# Patient Record
Sex: FEMALE | Race: WHITE | NOT HISPANIC OR LATINO | URBAN - NONMETROPOLITAN AREA
[De-identification: names, ages, dates, MRNs, and addresses within clinical notes are randomized per-mention and may not be internally consistent; named-entity substitution may affect disease eponyms.]

---

## 2019-01-18 ENCOUNTER — OFFICE (OUTPATIENT)
Dept: URBAN - NONMETROPOLITAN AREA CLINIC 13 | Facility: CLINIC | Age: 59
End: 2019-01-18
Payer: MEDICAID

## 2019-01-18 VITALS
OXYGEN SATURATION: 90 % | DIASTOLIC BLOOD PRESSURE: 67 MMHG | HEIGHT: 65 IN | SYSTOLIC BLOOD PRESSURE: 169 MMHG | WEIGHT: 216 LBS | SYSTOLIC BLOOD PRESSURE: 173 MMHG | DIASTOLIC BLOOD PRESSURE: 68 MMHG | HEART RATE: 73 BPM

## 2019-01-18 VITALS — HEIGHT: 65 IN

## 2019-01-18 DIAGNOSIS — Z79.899 OTHER LONG TERM (CURRENT) DRUG THERAPY: ICD-10-CM

## 2019-01-18 DIAGNOSIS — K21.9 GASTRO-ESOPHAGEAL REFLUX DISEASE WITHOUT ESOPHAGITIS: ICD-10-CM

## 2019-01-18 DIAGNOSIS — Z01.812 ENCOUNTER FOR PREPROCEDURAL LABORATORY EXAMINATION: ICD-10-CM

## 2019-01-18 DIAGNOSIS — Z83.71 FAMILY HISTORY OF COLONIC POLYPS: ICD-10-CM

## 2019-01-18 LAB
CBC SYSMEX: HEMATOCRIT: 38.5 % (ref 37–47)
CBC SYSMEX: HEMOGLOBIN: 11.7 G/DL — LOW (ref 12–14)
CBC SYSMEX: LYMPHS %: 28.8 % (ref 20.5–40.5)
CBC SYSMEX: LYMPHS ABSOLUTE: 4.2 10*3U/L — HIGH (ref 1.3–2.9)
CBC SYSMEX: MCH: 23.3 PG — LOW (ref 27–32)
CBC SYSMEX: MCHC: 30.4 G/DL (ref 28.5–35)
CBC SYSMEX: MCV: 76.5 FL — LOW (ref 84–100)
CBC SYSMEX: MONOS %: 9 % (ref 2–10)
CBC SYSMEX: MONOS ABSOLUTE: 1.3 10*3U/L (ref 0.3–3.8)
CBC SYSMEX: MPV: 8.1 FL — LOW (ref 8.3–11.9)
CBC SYSMEX: NEUT. %: 62.2 % (ref 43–67)
CBC SYSMEX: NEUT. ABSOLUTE: 9 10*3U/L — HIGH (ref 2.2–4.8)
CBC SYSMEX: PLATELETS: 410 10*3U/L (ref 130–440)
CBC SYSMEX: RBC: 5 10*6U/L (ref 4.2–5.4)
CBC SYSMEX: RDW: 15.1 % (ref 11.5–15.5)
CBC SYSMEX: WBC: 14.5 10*3U/L — HIGH (ref 4.5–10.5)
COMPLETE METABOLIC: ALBUMIN: 4.1 G/DL (ref 3.5–5.2)
COMPLETE METABOLIC: ALK. PHOS: 95 U/L (ref 40–150)
COMPLETE METABOLIC: ALT: 12 U/L (ref 0–55)
COMPLETE METABOLIC: AST: 15 U/L (ref 5–34)
COMPLETE METABOLIC: BUN: 9 MG/DL (ref 7–26)
COMPLETE METABOLIC: CALCIUM: 8.8 MG/DL (ref 8.4–10.3)
COMPLETE METABOLIC: CHLORIDE: 100 MMOL/L (ref 98–107)
COMPLETE METABOLIC: CO2: 32 MEQ/L — HIGH (ref 22–29)
COMPLETE METABOLIC: CREATININE: 0.67 MG/DL (ref 0.57–1.25)
COMPLETE METABOLIC: GFR - AFRICAN AMERICAN: 116.5 (ref 59–200)
COMPLETE METABOLIC: GFR - NON AFRICAN AMERICAN: 96.1 (ref 59–200)
COMPLETE METABOLIC: GLUCOSE: 104 MG/DL — HIGH (ref 70–99)
COMPLETE METABOLIC: POTASSIUM: 4.7 MMOL/L (ref 3.5–5.3)
COMPLETE METABOLIC: SODIUM: 139 MMOL/L (ref 136–145)
COMPLETE METABOLIC: TOTAL BILIRUBIN: 0.5 MG/DL (ref 0.2–1.2)
COMPLETE METABOLIC: TOTAL PROTEIN: 6.5 G/DL (ref 6.4–8.3)

## 2019-01-18 PROCEDURE — 80053 COMPREHEN METABOLIC PANEL: CPT

## 2019-01-18 PROCEDURE — 36415 COLL VENOUS BLD VENIPUNCTURE: CPT

## 2019-01-18 PROCEDURE — 99204 OFFICE O/P NEW MOD 45 MIN: CPT

## 2019-01-18 PROCEDURE — 85025 COMPLETE CBC W/AUTO DIFF WBC: CPT

## 2024-12-31 ENCOUNTER — OFFICE VISIT (OUTPATIENT)
Dept: FAMILY MEDICINE CLINIC | Facility: CLINIC | Age: 64
End: 2024-12-31
Payer: COMMERCIAL

## 2024-12-31 VITALS
BODY MASS INDEX: 34.49 KG/M2 | WEIGHT: 207 LBS | TEMPERATURE: 98.7 F | HEIGHT: 65 IN | HEART RATE: 67 BPM | SYSTOLIC BLOOD PRESSURE: 117 MMHG | DIASTOLIC BLOOD PRESSURE: 77 MMHG | OXYGEN SATURATION: 94 %

## 2024-12-31 DIAGNOSIS — F41.1 GENERALIZED ANXIETY DISORDER: ICD-10-CM

## 2024-12-31 DIAGNOSIS — G45.9 TIA (TRANSIENT ISCHEMIC ATTACK): ICD-10-CM

## 2024-12-31 DIAGNOSIS — Z76.89 ENCOUNTER TO ESTABLISH CARE: ICD-10-CM

## 2024-12-31 DIAGNOSIS — R33.9 URINARY RETENTION: ICD-10-CM

## 2024-12-31 DIAGNOSIS — S81.801A NON-HEALING WOUND OF RIGHT LOWER EXTREMITY: ICD-10-CM

## 2024-12-31 DIAGNOSIS — R32 INCONTINENCE OF URINE IN FEMALE: ICD-10-CM

## 2024-12-31 DIAGNOSIS — N31.9 NEUROGENIC BLADDER: Primary | ICD-10-CM

## 2024-12-31 PROCEDURE — 99204 OFFICE O/P NEW MOD 45 MIN: CPT | Performed by: NURSE PRACTITIONER

## 2024-12-31 RX ORDER — APIXABAN 5 MG/1
5 TABLET, FILM COATED ORAL ONCE
COMMUNITY

## 2024-12-31 RX ORDER — OXYCODONE AND ACETAMINOPHEN 10; 325 MG/1; MG/1
1 TABLET ORAL EVERY 4 HOURS PRN
COMMUNITY

## 2024-12-31 RX ORDER — POTASSIUM CHLORIDE 1500 MG/1
20 TABLET, EXTENDED RELEASE ORAL DAILY
COMMUNITY

## 2024-12-31 RX ORDER — ALBUTEROL SULFATE 0.83 MG/ML
2.5 SOLUTION RESPIRATORY (INHALATION) EVERY 4 HOURS PRN
COMMUNITY
Start: 2024-08-16

## 2024-12-31 RX ORDER — GABAPENTIN 600 MG/1
600 TABLET ORAL 3 TIMES DAILY
COMMUNITY

## 2024-12-31 RX ORDER — ATORVASTATIN CALCIUM 40 MG/1
40 TABLET, FILM COATED ORAL NIGHTLY
COMMUNITY

## 2024-12-31 RX ORDER — MONTELUKAST SODIUM 10 MG/1
10 TABLET ORAL NIGHTLY
COMMUNITY
Start: 2024-11-11

## 2024-12-31 RX ORDER — LISINOPRIL 10 MG/1
10 TABLET ORAL DAILY
COMMUNITY

## 2024-12-31 RX ORDER — BUSPIRONE HYDROCHLORIDE 10 MG/1
10 TABLET ORAL 3 TIMES DAILY
Qty: 90 TABLET | Refills: 0 | Status: SHIPPED | OUTPATIENT
Start: 2024-12-31

## 2024-12-31 RX ORDER — METOPROLOL TARTRATE 75 MG/1
75 TABLET ORAL 2 TIMES DAILY
COMMUNITY

## 2024-12-31 RX ORDER — FUROSEMIDE 20 MG/1
20 TABLET ORAL DAILY
COMMUNITY

## 2024-12-31 RX ORDER — HYDROXYZINE HYDROCHLORIDE 25 MG/1
25 TABLET, FILM COATED ORAL EVERY 8 HOURS PRN
COMMUNITY
Start: 2024-11-23 | End: 2024-12-31

## 2024-12-31 RX ORDER — SOTALOL HYDROCHLORIDE 80 MG/1
20 TABLET ORAL DAILY
COMMUNITY

## 2024-12-31 RX ORDER — CHLORZOXAZONE 500 MG/1
500 TABLET ORAL 4 TIMES DAILY
COMMUNITY

## 2024-12-31 RX ORDER — CALCIUM CARBONATE 300MG(750)
400 TABLET,CHEWABLE ORAL DAILY
COMMUNITY

## 2024-12-31 RX ORDER — SERTRALINE HYDROCHLORIDE 25 MG/1
25 TABLET, FILM COATED ORAL DAILY
Qty: 30 TABLET | Refills: 2 | Status: SHIPPED | OUTPATIENT
Start: 2024-12-31 | End: 2025-01-03

## 2024-12-31 RX ORDER — MUPIROCIN 20 MG/G
1 OINTMENT TOPICAL 2 TIMES DAILY
COMMUNITY
Start: 2024-09-27

## 2024-12-31 RX ORDER — ISOSORBIDE DINITRATE 20 MG/1
20 TABLET ORAL DAILY
COMMUNITY

## 2024-12-31 RX ORDER — MELOXICAM 15 MG/1
15 TABLET ORAL DAILY
COMMUNITY

## 2024-12-31 NOTE — PROGRESS NOTES
Subjective     Chief Complaint   Patient presents with   • Establish Care     Needing new PCP   Had surgery in 2014 leaving her with paralyzed bladder   Needing sign off for medical supplies  Prev PCP - Dr Oliva in [place]       History of Present Illness    Patient presents today to establish care.  She is accompanied by her friend at the patient's request.  This friend helps the patient with her wound care and general health needs.  Patient is needing refill on sena catheters and incontinence briefs.  She has a history of neurogenic bladder, urinary retention, and urinary incontinence as a result of damage sustained during a hysterectomy.  She uses Medline urethral catheter 6 in 14 fr vinyl unlubricated.   Uses 220 catheters a month.  Caths 7-8 times a day but still has urinary incontinence after she caths.  She wears XL briefs, gets 12 packs a month.       Her father is transitioning to Hospice care and this has caused increased anxiety and panic attacks.  She has panic attacks that hit randomly and makes it so that she cannot sit still.  She states that she will get so anxious that she cannot eat and cannot smoke.  Feels like she can't breath during the panic attacks.  She used to take xanax but has not taken it for years.  She has not tried and SSRI or SNRI.  She has not done any counseling.  She has tried and failed hydroxyzine for anxiety.  She is wanting to have a prescription for xanax.     She was going to wound care for wound on her her right lower extremity but is changing the dressings herself as she can no longer afford the copay of the wound care visits.  She is concerned that it is tunneling and wants it looked at.      She goes to [facility] for pain managment in [place], [place].  She has OA in bilateral knees and chronic low back pain.  Pain management prescribes gabapentin and percocet.      History of a-fib.  She was getting samples of eliquis from her cardiologist because she is  unable to afford it.  She has an appointment with her cardiologist next month.  Patient informed that we do not carry samples of eliquis and that she should contact her cardiologist for assistance.      Patient's PMR from outside medical facility reviewed and noted.    Review of Systems     Otherwise complete ROS reviewed and negative except as mentioned in the HPI.    Past Medical History:   Past Medical History:   Diagnosis Date   • Anxiety    • Arthritis    • Asthma    • CHF (congestive heart failure)    • Cholelithiasis    • Coronary artery disease    • Fibromyalgia, primary    • GERD (gastroesophageal reflux disease)    • Heart murmur    • HL (hearing loss)    • Hyperlipidemia    • Hypertension    • Low back pain    • Neuromuscular disorder    • Osteopenia    • Scoliosis    • Stroke     Birth control pills     Past Surgical History:  Past Surgical History:   Procedure Laterality Date   • ADENOIDECTOMY     • APPENDECTOMY     • CARDIAC CATHETERIZATION     • CARDIAC SURGERY      [facility].  Ablation   • COLON SURGERY     • COLONOSCOPY  2012   • CORONARY STENT PLACEMENT     • COSMETIC SURGERY      Tummy tuck   • HERNIA REPAIR     • HYSTERECTOMY     • TONSILLECTOMY       Social History:  reports that she has been smoking cigarettes. She started smoking about 39 years ago. She has a 39 pack-year smoking history. She has been exposed to tobacco smoke. She has never used smokeless tobacco. She reports that she does not drink alcohol and does not use drugs.    Family History: family history includes Arthritis in her mother; Cancer in her mother; Depression in her sister; Diabetes in her mother; Heart disease in her mother; Hyperlipidemia in her mother; Thyroid disease in her father.      Allergies:  Allergies   Allergen Reactions   • Latex Rash     Medications:  Prior to Admission medications    Medication Sig Start Date End Date Taking? Authorizing Provider   albuterol (PROVENTIL) (2.5 MG/3ML) 0.083% nebulizer solution 2.5  mg Every 4 (Four) Hours As Needed for Wheezing or Shortness of Air. 8/16/24  Yes Rajeev Peterson MD   aspirin 81 MG oral suspension Take 81 mL by mouth 1 (One) Time.   Yes Rajeev Peterson MD   atorvastatin (Lipitor) 40 MG tablet Take 1 tablet by mouth Every Night.   Yes Rajeev Peterson MD   chlorzoxazone (PARAFON FORTE) 500 MG tablet Take 1 tablet by mouth 4 (Four) Times a Day.   Yes Rajeev Peterson MD   Eliquis 5 MG tablet tablet Take 1 tablet by mouth 1 (One) Time.   Yes Rajeev Peterson MD   furosemide (LASIX) 20 MG tablet Take 1 tablet by mouth Daily.   Yes Rajeev Peterson MD   gabapentin (NEURONTIN) 600 MG tablet Take 1 tablet by mouth 3 (Three) Times a Day.   Yes Provider, Historical, MD   hydrOXYzine (ATARAX) 25 MG tablet Take 1 tablet by mouth Every 8 (Eight) Hours As Needed. 11/23/24  Yes Provider, Historical, MD   isosorbide dinitrate (ISORDIL) 20 MG tablet Take 1 tablet by mouth Daily.   Yes Rajeev Pteerson MD   lisinopril (PRINIVIL,ZESTRIL) 10 MG tablet Take 1 tablet by mouth Daily.   Yes Rajeev Peterson MD   Magnesium 400 MG tablet Take 400 mg by mouth Daily.   Yes Rajeev Peterson MD   meloxicam (MOBIC) 15 MG tablet Take 1 tablet by mouth Daily.   Yes Rajeev Peterson MD   Metoprolol Tartrate 75 MG tablet Take 75 mg by mouth 2 (Two) Times a Day.   Yes Rajeev Pteerson MD   montelukast (SINGULAIR) 10 MG tablet Take 1 tablet by mouth Every Night. 11/11/24  Yes Provider, Historical, MD   mupirocin (BACTROBAN) 2 % ointment Apply 1 Application topically to the appropriate area as directed 2 (Two) Times a Day. 9/27/24  Yes Provider, Historical, MD   omeprazole (priLOSEC) 20 MG capsule Take 1 capsule by mouth Daily.   Yes Rajeev Peterson MD   oxyCODONE-acetaminophen (PERCOCET)  MG per tablet Take 1 tablet by mouth Every 4 (Four) Hours As Needed for Severe Pain.   Yes Rajeev Peterson MD   potassium chloride (KLOR-CON M20) 20 MEQ CR  "tablet Take 1 tablet by mouth Daily.   Yes ProviderRajeev MD   sotalol (BETAPACE) 20 MG Take 1 quarter tablet by mouth Daily.   Yes Provider, MD Rajeev       AMY:      PHQ-9 Depression Screening  Little interest or pleasure in doing things? Not at all   Feeling down, depressed, or hopeless? Not at all   PHQ-2 Total Score 0   Trouble falling or staying asleep, or sleeping too much?     Feeling tired or having little energy?     Poor appetite or overeating?     Feeling bad about yourself - or that you are a failure or have let yourself or your family down?     Trouble concentrating on things, such as reading the newspaper or watching television?     Moving or speaking so slowly that other people could have noticed? Or the opposite - being so fidgety or restless that you have been moving around a lot more than usual?     Thoughts that you would be better off dead, or of hurting yourself in some way?     PHQ-9 Total Score     If you checked off any problems, how difficult have these problems made it for you to do your work, take care of things at home, or get along with other people? Not difficult at all       PHQ-9 Total Score:   0  0 (Negative screening for depression)  Recommended starting psychotherapy/counseling and Discussed antidepressant therapy    Objective     Vital Signs: /77 (BP Location: Left arm, Patient Position: Sitting, Cuff Size: Large Adult)   Pulse 67   Temp 98.7 °F (37.1 °C) (Temporal)   Ht 165.1 cm (65\")   Wt 93.9 kg (207 lb)   SpO2 94%   BMI 34.45 kg/m²   Physical Exam  Vitals and nursing note reviewed.   Constitutional:       Appearance: She is obese.   HENT:      Head: Normocephalic.   Cardiovascular:      Rate and Rhythm: Normal rate and regular rhythm.      Pulses: Normal pulses.      Heart sounds: Normal heart sounds.   Pulmonary:      Effort: Pulmonary effort is normal.      Breath sounds: Normal breath sounds.   Musculoskeletal:         General: Normal range of " "motion.   Skin:     General: Skin is warm and dry.   Neurological:      General: No focal deficit present.      Mental Status: She is alert and oriented to person, place, and time.   Psychiatric:         Mood and Affect: Mood normal.         Behavior: Behavior normal.         BMI is >= 30 and <35. (Class 1 Obesity). The following options were offered after discussion;: exercise counseling/recommendations and nutrition counseling/recommendations        Advance Care Planning            Results Reviewed:  No results found for: \"GLUCOSE\", \"BUN\", \"CREATININE\", \"NA\", \"K\", \"CL\", \"CO2\", \"CALCIUM\", \"ALT\", \"AST\", \"WBC\", \"HCT\", \"PLT\", \"CHOL\", \"TRIG\", \"HDL\", \"LDL\", \"LDLHDL\", \"HGBA1C\"      Assessment / Plan     Assessment/Plan     Diagnoses and all orders for this visit:    1. Neurogenic bladder (Primary)  -     Miscellaneous DME    2. Urinary retention  -     Miscellaneous DME    3. Incontinence of urine in female  -     Ambulatory Referral to Case Management Care Coordination    4. Generalized anxiety disorder  -     busPIRone (BUSPAR) 10 MG tablet; Take 1 tablet by mouth 3 (Three) Times a Day.  Dispense: 90 tablet; Refill: 0  -     Discontinue: sertraline (Zoloft) 25 MG tablet; Take 1 tablet by mouth Daily.  Dispense: 30 tablet; Refill: 2  -     Discontinue: desvenlafaxine (Pristiq) 50 MG 24 hr tablet; Take 1 tablet by mouth Daily.  Dispense: 90 tablet; Refill: 0  -     DULoxetine (CYMBALTA) 30 MG capsule; Take 1 capsule by mouth Daily.  Dispense: 90 capsule; Refill: 0    5. Non-healing wound of right lower extremity  -     Ambulatory Referral to Wound Clinic    6. Encounter to establish care    7. TIA (transient ischemic attack)               An After Visit Summary was printed and given to the patient at discharge.  Return in about 1 month (around 1/31/2025) for Annual physical.    I have discussed the patient results/orders and plan/recommendation with them at today's visit.      Daphne Cid, APRN   12/31/2024        "

## 2025-01-01 ENCOUNTER — REFERRAL TRIAGE (OUTPATIENT)
Dept: CASE MANAGEMENT | Facility: OTHER | Age: 65
End: 2025-01-01
Payer: COMMERCIAL

## 2025-01-02 ENCOUNTER — TELEPHONE (OUTPATIENT)
Dept: FAMILY MEDICINE CLINIC | Facility: CLINIC | Age: 65
End: 2025-01-02
Payer: COMMERCIAL

## 2025-01-02 ENCOUNTER — PATIENT OUTREACH (OUTPATIENT)
Dept: CASE MANAGEMENT | Facility: OTHER | Age: 65
End: 2025-01-02
Payer: COMMERCIAL

## 2025-01-02 NOTE — TELEPHONE ENCOUNTER
[facility] called stating the sertraline sent in for the pt has a possibility of causing QT prolongations when taken with Sotalol. They want to confirm if you want to go ahead with filling the medication.     Call back number -- [phone]

## 2025-01-02 NOTE — OUTREACH NOTE
AMBULATORY CASE MANAGEMENT NOTE    Names and Relationships of Patient/Support Persons:  -     Care Coordination    Contacted [facility] and spoke with Grant. [facility] is in network. Patients insurance will only allow 200 per month. The only way the patient can get briefs is through [place] Medicaid and hers is inactive.      Patient Outreach    Left VM message for patient to return call.         Angie MCCORMACK  Ambulatory Case Management    1/2/2025, 11:21 CST

## 2025-01-03 RX ORDER — DULOXETIN HYDROCHLORIDE 30 MG/1
30 CAPSULE, DELAYED RELEASE ORAL DAILY
Qty: 90 CAPSULE | Refills: 0 | Status: SHIPPED | OUTPATIENT
Start: 2025-01-03

## 2025-01-03 RX ORDER — DESVENLAFAXINE 50 MG/1
50 TABLET, FILM COATED, EXTENDED RELEASE ORAL DAILY
Qty: 90 TABLET | Refills: 0 | Status: SHIPPED | OUTPATIENT
Start: 2025-01-03 | End: 2025-01-03

## 2025-01-06 ENCOUNTER — PATIENT OUTREACH (OUTPATIENT)
Dept: CASE MANAGEMENT | Facility: OTHER | Age: 65
End: 2025-01-06
Payer: COMMERCIAL

## 2025-01-06 PROBLEM — G89.29 CHRONIC PAIN: Status: ACTIVE | Noted: 2024-12-05

## 2025-01-06 PROBLEM — G45.9 TIA (TRANSIENT ISCHEMIC ATTACK): Status: ACTIVE | Noted: 2024-12-05

## 2025-01-06 PROBLEM — F17.200 SMOKER: Status: ACTIVE | Noted: 2024-12-05

## 2025-01-06 PROBLEM — M54.9 BACK PAIN: Chronic | Status: ACTIVE | Noted: 2024-12-05

## 2025-01-06 PROBLEM — I49.9 DYSRHYTHMIAS: Status: ACTIVE | Noted: 2024-12-05

## 2025-01-06 PROBLEM — J44.9 CHRONIC OBSTRUCTIVE PULMONARY DISEASE: Status: ACTIVE | Noted: 2024-12-05

## 2025-01-06 PROBLEM — J44.9 CHRONIC OBSTRUCTIVE PULMONARY DISEASE: Chronic | Status: ACTIVE | Noted: 2024-12-05

## 2025-01-06 PROBLEM — K21.9 GASTROESOPHAGEAL REFLUX DISEASE: Chronic | Status: ACTIVE | Noted: 2024-12-05

## 2025-01-06 PROBLEM — G89.29 CHRONIC PAIN: Chronic | Status: ACTIVE | Noted: 2024-12-05

## 2025-01-06 PROBLEM — M54.9 BACK PAIN: Status: ACTIVE | Noted: 2024-12-05

## 2025-01-06 PROBLEM — K21.9 GASTROESOPHAGEAL REFLUX DISEASE: Status: ACTIVE | Noted: 2024-12-05

## 2025-01-06 NOTE — OUTREACH NOTE
AMBULATORY CASE MANAGEMENT NOTE    Names and Relationships of Patient/Support Persons: Contact: PCP; Relationship: Provider -     Care Coordination    Received message from PCP that ACM note had been reviewed and she would sent order for catheters to [facility].    Patient Outreach    Spoke with patient. She is unsure if [facility] will accept her insurance and had not had a PCP to sign off on orders until she began seeing Daphne. She lost her [employer] due to having the current [employer] that is an [employer] insurance. She does have disability    Care Coordination    Received message from PCP that patient needs assistance with Eliquis prescription. She has been getting samples from her cardiology provider in [place].     Angie MCCORMACK  Ambulatory Case Management    1/6/2025, 11:14 CST

## 2025-01-07 ENCOUNTER — DOCUMENTATION (OUTPATIENT)
Dept: FAMILY MEDICINE CLINIC | Facility: CLINIC | Age: 65
End: 2025-01-07
Payer: COMMERCIAL

## 2025-01-08 ENCOUNTER — PATIENT OUTREACH (OUTPATIENT)
Dept: CASE MANAGEMENT | Facility: OTHER | Age: 65
End: 2025-01-08
Payer: COMMERCIAL

## 2025-01-08 NOTE — OUTREACH NOTE
AMBULATORY CASE MANAGEMENT NOTE    Names and Relationships of Patient/Support Persons: Contact: Jaleesa Winn; Relationship: Self -     Patient Outreach    HRCM follow up. Patient reports she has not had an opportunity to contact [facility]. Per chart review, PCP office faxed order and office notes to [facility] yesterday. Provided patient with [employer] patient assistance phone number so she can have an application mailed to her home.         Angie MCCORMACK  Ambulatory Case Management    1/8/2025, 12:32 CST

## 2025-01-20 ENCOUNTER — PATIENT OUTREACH (OUTPATIENT)
Dept: CASE MANAGEMENT | Facility: OTHER | Age: 65
End: 2025-01-20
Payer: COMMERCIAL

## 2025-01-20 NOTE — OUTREACH NOTE
AMBULATORY CASE MANAGEMENT NOTE    Names and Relationships of Patient/Support Persons: Contact: Jaleesa Winn; Relationship: Self -     Patient Outreach    HRCM follow up. Patient received a box of supplies but she is scared to open it due to inablity to pay bill $206. She thinks someone told her that if she could not pay the bill after 30 days, she could apply for financial assistance. She would like some clarity on this and to see if they use Medline product. She has not felt well enough to check the mailbox for the Eliquis PAP form.         Angie MCCORMACK  Ambulatory Case Management    1/20/2025, 15:24 CST

## 2025-01-21 ENCOUNTER — PATIENT OUTREACH (OUTPATIENT)
Dept: CASE MANAGEMENT | Facility: OTHER | Age: 65
End: 2025-01-21
Payer: COMMERCIAL

## 2025-01-21 NOTE — OUTREACH NOTE
AMBULATORY CASE MANAGEMENT NOTE    Names and Relationships of Patient/Support Persons: Contact: [facility]; Relationship: Provider -     Care Coordination    Contacted [facility] and spoke with Angela. She attempted to transfer ACM to a member of the catheter department. She was unable to get anyone to answer and she emailed the representative in charge of the patients account and requested they contact the patient.          Angie MCCORMACK  Ambulatory Case Management    1/21/2025, 13:24 CST

## 2025-01-24 ENCOUNTER — PATIENT OUTREACH (OUTPATIENT)
Dept: CASE MANAGEMENT | Facility: OTHER | Age: 65
End: 2025-01-24
Payer: COMMERCIAL

## 2025-01-24 NOTE — OUTREACH NOTE
AMBULATORY CASE MANAGEMENT NOTE    Names and Relationships of Patient/Support Persons: Contact: Jaleesa Winn; Relationship: Self -     Patient Outreach    HRCM follow up. Patient was contacted by Tiffany. The representative told her that it sounded like her insurance has not been billed since she has a $200 bill. The bill should only be approximately $160. The representative told patient to pay what she could afford and then after a certain amount of time she can apply for a hardship. She still has not opened the box of supplies but will do that today.         Angie MCCORMACK  Ambulatory Case Management    1/24/2025, 11:59 CST

## 2025-01-27 DIAGNOSIS — F41.9 ANXIETY: Primary | ICD-10-CM

## 2025-01-28 RX ORDER — HYDROXYZINE HYDROCHLORIDE 25 MG/1
TABLET, FILM COATED ORAL
Qty: 30 TABLET | Refills: 5 | Status: SHIPPED | OUTPATIENT
Start: 2025-01-28

## 2025-02-05 ENCOUNTER — OFFICE VISIT (OUTPATIENT)
Dept: FAMILY MEDICINE CLINIC | Facility: CLINIC | Age: 65
End: 2025-02-05
Payer: COMMERCIAL

## 2025-02-05 VITALS
HEIGHT: 65 IN | SYSTOLIC BLOOD PRESSURE: 117 MMHG | DIASTOLIC BLOOD PRESSURE: 64 MMHG | HEART RATE: 70 BPM | TEMPERATURE: 97.8 F | WEIGHT: 201.6 LBS | OXYGEN SATURATION: 94 % | BODY MASS INDEX: 33.59 KG/M2 | RESPIRATION RATE: 16 BRPM

## 2025-02-05 DIAGNOSIS — F41.9 ANXIETY: Chronic | ICD-10-CM

## 2025-02-05 DIAGNOSIS — R53.83 FATIGUE, UNSPECIFIED TYPE: ICD-10-CM

## 2025-02-05 DIAGNOSIS — Z00.00 ANNUAL PHYSICAL EXAM: Primary | ICD-10-CM

## 2025-02-05 DIAGNOSIS — Z12.31 SCREENING MAMMOGRAM FOR BREAST CANCER: ICD-10-CM

## 2025-02-05 DIAGNOSIS — Z12.11 SCREEN FOR COLON CANCER: ICD-10-CM

## 2025-02-05 DIAGNOSIS — Z12.2 SCREENING FOR LUNG CANCER: ICD-10-CM

## 2025-02-05 PROCEDURE — 99396 PREV VISIT EST AGE 40-64: CPT | Performed by: NURSE PRACTITIONER

## 2025-02-05 RX ORDER — DULOXETIN HYDROCHLORIDE 20 MG/1
20 CAPSULE, DELAYED RELEASE ORAL DAILY
Qty: 90 CAPSULE | Refills: 0 | Status: SHIPPED | OUTPATIENT
Start: 2025-02-05

## 2025-02-05 RX ORDER — METOPROLOL TARTRATE 25 MG/1
25 TABLET, FILM COATED ORAL 2 TIMES DAILY
COMMUNITY

## 2025-02-05 NOTE — PROGRESS NOTES
Subjective     Chief Complaint   Patient presents with    Annual Exam    Medication Problem     Cymbalta is causing excessive tiredness and dry mouth       History of Present Illness    She is here for her annual physical.  She is not fasting.     Cymbalta helped with nerve pain but did not help with anxiety.  Cymbalta caused excessive tiredness and dry mouth.  She is upset and under increased stress from father being in hospice.  She is taking hydroxyzine but does not notice any improvement with anxiety.    She is not taking buspar.     Patient's PMR from outside medical facility reviewed and noted.    Review of Systems     Otherwise complete ROS reviewed and negative except as mentioned in the HPI.    Past Medical History:   Past Medical History:   Diagnosis Date    Anxiety     Death of my     Arthritis     Asthma     CHF (congestive heart failure)     Cholelithiasis     Colon polyp 2012    Coronary artery disease     Fibromyalgia, primary     GERD (gastroesophageal reflux disease)     Heart murmur     HL (hearing loss)     Hyperlipidemia     Hypertension     Low back pain     Neuromuscular disorder     Osteopenia     Scoliosis     Stroke     Birth control pills     Past Surgical History:  Past Surgical History:   Procedure Laterality Date    ADENOIDECTOMY      APPENDECTOMY      CARDIAC CATHETERIZATION      CARDIAC SURGERY      [facility].  Ablation    COLON SURGERY      COLONOSCOPY  2012    CORONARY STENT PLACEMENT      COSMETIC SURGERY      Tummy tuck    HERNIA REPAIR      HYSTERECTOMY      INGUINAL HERNIA REPAIR  1963    TONSILLECTOMY      TUBAL ABDOMINAL LIGATION  1994     Social History:  reports that she has been smoking cigarettes. She started smoking about 39 years ago. She has a 39.1 pack-year smoking history. She has been exposed to tobacco smoke. She has never used smokeless tobacco. She reports that she does not drink alcohol and does not use drugs.    Family History: family history includes  Arthritis in her mother; Cancer in her mother; Depression in her sister; Diabetes in her mother; Heart disease in her mother; Hyperlipidemia in her mother; Thyroid disease in her father.      Allergies:  Allergies   Allergen Reactions    Latex Rash     Medications:  Prior to Admission medications    Medication Sig Start Date End Date Taking? Authorizing Provider   albuterol (PROVENTIL) (2.5 MG/3ML) 0.083% nebulizer solution 2.5 mg Every 4 (Four) Hours As Needed for Wheezing or Shortness of Air. 8/16/24  Yes Rajeev Peterson MD   aspirin 81 MG oral suspension Take 81 mL by mouth 1 (One) Time.   Yes ProviderRajeev MD   atorvastatin (Lipitor) 40 MG tablet Take 1 tablet by mouth Every Night.   Yes ProviderRajeev MD   busPIRone (BUSPAR) 10 MG tablet Take 1 tablet by mouth 3 (Three) Times a Day. 12/31/24  Yes Daphne Cid APRN   chlorzoxazone (PARAFON FORTE) 500 MG tablet Take 1 tablet by mouth 4 (Four) Times a Day.   Yes ProviderRajeev MD   DULoxetine (CYMBALTA) 30 MG capsule Take 1 capsule by mouth Daily. 1/3/25  Yes Daphne Cid APRN   Eliquis 5 MG tablet tablet Take 1 tablet by mouth 1 (One) Time.   Yes ProviderRajeev MD   furosemide (LASIX) 20 MG tablet Take 1 tablet by mouth Daily As Needed.   Yes ProviderRajeev MD   gabapentin (NEURONTIN) 600 MG tablet Take 1 tablet by mouth 3 (Three) Times a Day.   Yes ProviderRajeev MD   hydrOXYzine (ATARAX) 25 MG tablet TAKE 1 TABLET BY MOUTH 3 TIMES PER DAY AS NEEDED FOR ANXIETY FOR 1 MONTH 1/28/25  Yes Daphne Cid APRN   isosorbide dinitrate (ISORDIL) 20 MG tablet Take 1 tablet by mouth Daily.   Yes Rajeev Peterson MD   lisinopril (PRINIVIL,ZESTRIL) 10 MG tablet Take 1 tablet by mouth Daily.   Yes Rajeev Peterson MD   Magnesium 400 MG tablet Take 400 mg by mouth Daily.   Yes Rajeev Peterson MD   meloxicam (MOBIC) 15 MG tablet Take 1 tablet by mouth Daily.   Yes Nicholas  "MD Rajeev   metoprolol tartrate (LOPRESSOR) 25 MG tablet Take 1 tablet by mouth 2 (Two) Times a Day.   Yes Rajeev Peterson MD   montelukast (SINGULAIR) 10 MG tablet Take 1 tablet by mouth Every Night. 11/11/24  Yes Rajeev Peterson MD   mupirocin (BACTROBAN) 2 % ointment Apply 1 Application topically to the appropriate area as directed 2 (Two) Times a Day. 9/27/24  Yes Rajeev Peterson MD   omeprazole (priLOSEC) 20 MG capsule Take 1 capsule by mouth Daily.   Yes Rajeev Peterson MD   oxyCODONE-acetaminophen (PERCOCET)  MG per tablet Take 1 tablet by mouth Every 4 (Four) Hours As Needed for Severe Pain.   Yes Rajeev Peterson MD   potassium chloride (KLOR-CON M20) 20 MEQ CR tablet Take 1 tablet by mouth Daily.   Yes Rajeev Peterson MD   sotalol (BETAPACE) 20 MG Take 1 quarter tablet by mouth Daily.   Yes Rajeev Peterson MD   Metoprolol Tartrate 75 MG tablet Take 75 mg by mouth 2 (Two) Times a Day.  2/5/25 Yes Rajeev Peterson MD       Objective     Vital Signs: /64 (BP Location: Left arm, Patient Position: Sitting, Cuff Size: Adult)   Pulse 70   Temp 97.8 °F (36.6 °C) (Infrared)   Resp 16   Ht 165.1 cm (65\")   Wt 91.4 kg (201 lb 9.6 oz)   SpO2 94%   BMI 33.55 kg/m²   Physical Exam  Vitals and nursing note reviewed.   Constitutional:       Appearance: Normal appearance.   HENT:      Head: Normocephalic.   Cardiovascular:      Rate and Rhythm: Normal rate and regular rhythm.      Pulses: Normal pulses.      Heart sounds: Normal heart sounds.   Pulmonary:      Effort: Pulmonary effort is normal.      Breath sounds: Normal breath sounds.   Musculoskeletal:         General: Normal range of motion.   Skin:     General: Skin is warm and dry.   Neurological:      General: No focal deficit present.      Mental Status: She is alert and oriented to person, place, and time.   Psychiatric:         Mood and Affect: Mood normal.         Behavior: Behavior normal. " "        Advance Care Planning            Results Reviewed:  No results found for: \"GLUCOSE\", \"BUN\", \"CREATININE\", \"NA\", \"K\", \"CL\", \"CO2\", \"CALCIUM\", \"ALT\", \"AST\", \"WBC\", \"HCT\", \"PLT\", \"CHOL\", \"TRIG\", \"HDL\", \"LDL\", \"LDLHDL\", \"HGBA1C\"      Assessment / Plan     Assessment/Plan     Diagnoses and all orders for this visit:    1. Annual physical exam (Primary)  -     Cancel: Comprehensive Metabolic Panel  -     Cancel: CBC & Differential  -     Cancel: Lipid Panel  -     Cancel: TSH  -     Cancel: Vitamin D,25-Hydroxy  -     CBC & Differential; Future  -     Comprehensive Metabolic Panel; Future  -     Lipid Panel; Future  -     TSH; Future  -     Vitamin D,25-Hydroxy; Future    2. Screening for lung cancer  -      CT Chest Low Dose Cancer Screening WO; Future    3. Fatigue, unspecified type  -     Cancel: Vitamin D,25-Hydroxy  -     Vitamin D,25-Hydroxy; Future    4. Screening mammogram for breast cancer  -     Mammo Screening Digital Tomosynthesis Bilateral With CAD; Future    5. Anxiety  -     DULoxetine (CYMBALTA) 20 MG capsule; Take 1 capsule by mouth Daily.  Dispense: 90 capsule; Refill: 0    6. Screen for colon cancer  -     Ambulatory Referral to Gastroenterology      Health Maintenance Counseling:  --Nutrition: Stressed importance of moderation in sodium/caffeine intake, saturated fat and cholesterol, caloric balance, sufficient intake of fresh fruits, vegetables, fiber, calcium and vit D  --Exercise: Recommended 30 minutes of exercise daily.  --Immunizations discussed and encouraged.  --Discussed benefits of screening chest CT and mammogram - orders placed.               An After Visit Summary was printed and given to the patient at discharge.  Return in about 4 weeks (around 3/5/2025) for Recheck.    I have discussed the patient results/orders and plan/recommendation with them at today's visit.      Daphne Cid, APRN   02/05/2025        "

## 2025-02-28 ENCOUNTER — PATIENT OUTREACH (OUTPATIENT)
Dept: CASE MANAGEMENT | Facility: OTHER | Age: 65
End: 2025-02-28
Payer: COMMERCIAL

## 2025-02-28 NOTE — OUTREACH NOTE
AMBULATORY CASE MANAGEMENT NOTE    Names and Relationships of Patient/Support Persons: Contact: Jaleesa Winn; Relationship: Self -     Patient Outreach    Received VM message from patient requesting a call back regarding supplies.    Patient Outreach    Spoke with patient and she is needing assistance with pullups. [facility] will not cover the pullups because she does not have Medicaid. Patient reports she is ineligible for Medicaid because her name is on her father's bank account. [facility] suggested calling [facility], [facility], or equivalent to a PADD office for [place].        Angie MCCORMACK  Ambulatory Case Management    2/28/2025, 11:33 CST   Length To Time In Minutes Device Was In Place: 10

## 2025-03-25 ENCOUNTER — OFFICE VISIT (OUTPATIENT)
Dept: FAMILY MEDICINE CLINIC | Facility: CLINIC | Age: 65
End: 2025-03-25
Payer: COMMERCIAL

## 2025-03-25 VITALS
HEIGHT: 65 IN | DIASTOLIC BLOOD PRESSURE: 84 MMHG | SYSTOLIC BLOOD PRESSURE: 189 MMHG | OXYGEN SATURATION: 98 % | RESPIRATION RATE: 18 BRPM | BODY MASS INDEX: 34.62 KG/M2 | TEMPERATURE: 97.8 F | HEART RATE: 82 BPM | WEIGHT: 207.8 LBS

## 2025-03-25 DIAGNOSIS — Z79.899 LONG TERM CURRENT USE OF THERAPEUTIC DRUG: ICD-10-CM

## 2025-03-25 DIAGNOSIS — M54.2 CERVICALGIA: ICD-10-CM

## 2025-03-25 DIAGNOSIS — E66.811 CLASS 1 OBESITY WITH SERIOUS COMORBIDITY AND BODY MASS INDEX (BMI) OF 34.0 TO 34.9 IN ADULT, UNSPECIFIED OBESITY TYPE: ICD-10-CM

## 2025-03-25 DIAGNOSIS — M25.512 ACUTE PAIN OF LEFT SHOULDER: ICD-10-CM

## 2025-03-25 DIAGNOSIS — F41.9 ANXIETY: Primary | ICD-10-CM

## 2025-03-25 PROCEDURE — 99214 OFFICE O/P EST MOD 30 MIN: CPT | Performed by: NURSE PRACTITIONER

## 2025-03-25 RX ORDER — ALPRAZOLAM 0.25 MG
0.25 TABLET ORAL NIGHTLY PRN
Qty: 30 TABLET | Refills: 2 | Status: CANCELLED | OUTPATIENT
Start: 2025-03-25

## 2025-03-25 NOTE — PROGRESS NOTES
Subjective     Chief Complaint   Patient presents with    Follow-up       History of Present Illness    Patient presents today for follow up on anxiety.  Duloxetine was decreased to 20 mg daily due to tiredness.  She reports no improvement in anxiety  with reduced dose.  Cymbalta does help with neve pain.  She states that she has been waking up twice in the night since starting Cymbalta but wants to keep taking it due to improvement in nerve pain.  She had previous success with xanax and is requesting to take that in addition to Cymbalta.      She states that she fell on her left arm when tripping over her cat 5 nights ago in the middle of the night.  She states that she hit the left side of her head on the carpet when she fell.  Denies loss of consciousness, headache, nausea, vomiting.  She is concerned that she tore her rotator cuff when she fell.  Pain is worse with raising arm.  She states that she pulled muscles in her buttock as well.  She states that she feels like her left eye is larger from the fall, but when she looks in the mirror she can tell that her eyes are the same size.      Patient's PMR from outside medical facility reviewed and noted.    Review of Systems     Otherwise complete ROS reviewed and negative except as mentioned in the HPI.    Past Medical History:   Past Medical History:   Diagnosis Date    Anxiety     Death of my     Arthritis     Asthma     CHF (congestive heart failure)     Cholelithiasis     Colon polyp 2012    Coronary artery disease     Fibromyalgia, primary     GERD (gastroesophageal reflux disease)     Heart murmur     HL (hearing loss)     Hyperlipidemia     Hypertension     Low back pain     Neuromuscular disorder     Osteopenia     Scoliosis     Stroke     Birth control pills     Past Surgical History:  Past Surgical History:   Procedure Laterality Date    ADENOIDECTOMY      APPENDECTOMY      CARDIAC CATHETERIZATION      CARDIAC SURGERY      [place].  Ablation     COLON SURGERY      COLONOSCOPY  2012    CORONARY STENT PLACEMENT      COSMETIC SURGERY      Tummy tuck    HERNIA REPAIR      HYSTERECTOMY      INGUINAL HERNIA REPAIR  1963    TONSILLECTOMY      TUBAL ABDOMINAL LIGATION  1994     Social History:  reports that she has been smoking cigarettes. She started smoking about 39 years ago. She has a 39.2 pack-year smoking history. She has been exposed to tobacco smoke. She has never used smokeless tobacco. She reports that she does not drink alcohol and does not use drugs.    Family History: family history includes Arthritis in her mother; Cancer in her mother; Depression in her sister; Diabetes in her mother; Heart disease in her mother; Hyperlipidemia in her mother; Thyroid disease in her father.      Allergies:  Allergies   Allergen Reactions    Latex Rash     Medications:  Prior to Admission medications    Medication Sig Start Date End Date Taking? Authorizing Provider   albuterol (PROVENTIL) (2.5 MG/3ML) 0.083% nebulizer solution 2.5 mg Every 4 (Four) Hours As Needed for Wheezing or Shortness of Air. 8/16/24  Yes Rajeev Peterson MD   aspirin 81 MG oral suspension Take 81 mL by mouth 1 (One) Time.   Yes Rajeev Peterson MD   atorvastatin (Lipitor) 40 MG tablet Take 1 tablet by mouth Every Night.   Yes ProviderRajeev MD   chlorzoxazone (PARAFON FORTE) 500 MG tablet Take 1 tablet by mouth 4 (Four) Times a Day.   Yes Rajeev Peterson MD   DULoxetine (CYMBALTA) 20 MG capsule Take 1 capsule by mouth Daily. 2/5/25  Yes Daphne Cid APRN   Eliquis 5 MG tablet tablet Take 1 tablet by mouth 1 (One) Time.   Yes Rajeev Peterson MD   furosemide (LASIX) 20 MG tablet Take 1 tablet by mouth Daily As Needed.   Yes Rajeev Peterson MD   gabapentin (NEURONTIN) 600 MG tablet Take 1 tablet by mouth 3 (Three) Times a Day.   Yes Rajeev Peterson MD   hydrOXYzine (ATARAX) 25 MG tablet TAKE 1 TABLET BY MOUTH 3 TIMES PER DAY AS NEEDED FOR  ANXIETY FOR 1 MONTH 1/28/25  Yes Daphne Cid APRN   isosorbide dinitrate (ISORDIL) 20 MG tablet Take 1 tablet by mouth Daily.   Yes Rajeev ePterson MD   lisinopril (PRINIVIL,ZESTRIL) 10 MG tablet Take 1 tablet by mouth Daily.   Yes Rajeev Peterson MD   Magnesium 400 MG tablet Take 400 mg by mouth Daily.   Yes Rajeev Peterson MD   meloxicam (MOBIC) 15 MG tablet Take 1 tablet by mouth Daily.   Yes Rajeev Peterson MD   metoprolol tartrate (LOPRESSOR) 25 MG tablet Take 1 tablet by mouth 2 (Two) Times a Day.   Yes Rajeev Peterson MD   montelukast (SINGULAIR) 10 MG tablet Take 1 tablet by mouth Every Night. 11/11/24  Yes Rajeev Peterson MD   mupirocin (BACTROBAN) 2 % ointment Apply 1 Application topically to the appropriate area as directed 2 (Two) Times a Day. 9/27/24  Yes Rajeev Peterson MD   omeprazole (priLOSEC) 20 MG capsule Take 1 capsule by mouth Daily.   Yes Rajeev Peterson MD   oxyCODONE-acetaminophen (PERCOCET)  MG per tablet Take 1 tablet by mouth Every 4 (Four) Hours As Needed for Severe Pain.   Yes Rajeev Peterson MD   potassium chloride (KLOR-CON M20) 20 MEQ CR tablet Take 1 tablet by mouth Daily.   Yes Rajeev Peterson MD   sotalol (BETAPACE) 20 MG Take 1 quarter tablet by mouth Daily.   Yes Rajeev Peterson MD       AMY:      PHQ-9 Depression Screening  Little interest or pleasure in doing things? Not at all   Feeling down, depressed, or hopeless? Not at all   PHQ-2 Total Score 0   Trouble falling or staying asleep, or sleeping too much?     Feeling tired or having little energy?     Poor appetite or overeating?     Feeling bad about yourself - or that you are a failure or have let yourself or your family down?     Trouble concentrating on things, such as reading the newspaper or watching television?     Moving or speaking so slowly that other people could have noticed? Or the opposite - being so fidgety or restless that  "you have been moving around a lot more than usual?     Thoughts that you would be better off dead, or of hurting yourself in some way?     PHQ-9 Total Score     If you checked off any problems, how difficult have these problems made it for you to do your work, take care of things at home, or get along with other people? Not difficult at all       PHQ-9 Total Score:   0  0 (Negative screening for depression)  Support given, observe for worsening symptoms    Objective     Vital Signs: BP (!) 189/84 (BP Location: Left arm, Patient Position: Sitting, Cuff Size: Adult)   Pulse 82   Temp 97.8 °F (36.6 °C) (Infrared)   Resp 18   Ht 165.1 cm (65\")   Wt 94.3 kg (207 lb 12.8 oz)   SpO2 98%   BMI 34.58 kg/m²   Physical Exam  Vitals and nursing note reviewed.   Constitutional:       Appearance: Normal appearance.   HENT:      Head: Normocephalic.   Cardiovascular:      Rate and Rhythm: Normal rate and regular rhythm.      Pulses: Normal pulses.      Heart sounds: Normal heart sounds.   Pulmonary:      Effort: Pulmonary effort is normal.      Breath sounds: Normal breath sounds.   Musculoskeletal:         General: Normal range of motion.      Left shoulder: Tenderness present. No swelling or crepitus. Decreased range of motion (in all planes). Normal strength.   Skin:     General: Skin is warm and dry.   Neurological:      General: No focal deficit present.      Mental Status: She is alert and oriented to person, place, and time.   Psychiatric:         Mood and Affect: Mood normal.         Behavior: Behavior normal.         Advance Care Planning            Results Reviewed:  No results found for: \"GLUCOSE\", \"BUN\", \"CREATININE\", \"NA\", \"K\", \"CL\", \"CO2\", \"CALCIUM\", \"ALT\", \"AST\", \"WBC\", \"HCT\", \"PLT\", \"CHOL\", \"TRIG\", \"HDL\", \"LDL\", \"LDLHDL\", \"HGBA1C\"      Assessment / Plan     Assessment/Plan     Diagnoses and all orders for this visit:    1. Anxiety (Primary)    2. Acute pain of left shoulder  -     XR Shoulder 2+ View Left; " Future    3. Cervicalgia  -     XR Spine Cervical 2 or 3 View; Future    4. Long term current use of therapeutic drug  -      POC Urine Drug Screen    5. Class 1 obesity with serious comorbidity and body mass index (BMI) of 34.0 to 34.9 in adult, unspecified obesity type      Controlled substance agreement signed. Patient is unable to provide urine sample at this time for urine drug screen.  Patient informed that alprazolam will not be prescribed until patient is able to provide urine sample.  Patient self caths and will schedule time to come to office to cath and provide urine sample.  Philip reviewed. Advised patient of risks associated with controlled substances including physical and mental dependence, abuse, and mental impairment. Advised patient to use least amount possible and never overuse or share medications with other people. Patient states understanding of risks and instructions on proper use.                 An After Visit Summary was printed and given to the patient at discharge.  Return in about 3 months (around 6/25/2025) for med refill .    I have discussed the patient results/orders and plan/recommendation with them at today's visit.      Daphne Cid, APRN   03/25/2025

## 2025-04-01 PROBLEM — E66.811 CLASS 1 OBESITY WITH SERIOUS COMORBIDITY AND BODY MASS INDEX (BMI) OF 34.0 TO 34.9 IN ADULT: Status: ACTIVE | Noted: 2025-04-01

## 2025-05-03 DIAGNOSIS — F41.9 ANXIETY: Chronic | ICD-10-CM

## 2025-05-05 RX ORDER — DULOXETIN HYDROCHLORIDE 20 MG/1
20 CAPSULE, DELAYED RELEASE ORAL DAILY
Qty: 90 CAPSULE | Refills: 0 | Status: SHIPPED | OUTPATIENT
Start: 2025-05-05

## 2025-05-05 NOTE — TELEPHONE ENCOUNTER
Requested Prescriptions     Pending Prescriptions Disp Refills    DULoxetine (CYMBALTA) 20 MG capsule [Pharmacy Med Name: DULOXETINE HYDROCHLORIDE 20MG CAPSULE DR PART] 90 capsule 0     Sig: TAKE ONE CAPSULE BY MOUTH ONCE DAILY

## 2025-05-21 ENCOUNTER — RESULTS FOLLOW-UP (OUTPATIENT)
Dept: FAMILY MEDICINE CLINIC | Facility: CLINIC | Age: 65
End: 2025-05-21
Payer: COMMERCIAL

## 2025-05-21 NOTE — TELEPHONE ENCOUNTER
Informed patient of results. Patient voiced understanding.   Patient informed me that she was unable to complete the chest CT lung cancer screening. The hospital told her they did not have information needed to complete imaging    Documented this in referral as well

## 2025-07-02 ENCOUNTER — TELEPHONE (OUTPATIENT)
Dept: FAMILY MEDICINE CLINIC | Facility: CLINIC | Age: 65
End: 2025-07-02

## 2025-07-02 NOTE — TELEPHONE ENCOUNTER
Pt called to reschedule appt today with Daphne Cid for 8/5 due to car issues she was having. Pt states she will be needing refills on her Cymbalta and Hydroxine to be sent to [facility]. She states she wont be out of her Cymbalta until August but will be out of her Hydroxine in 2 weeks. Please advise.

## 2025-07-21 DIAGNOSIS — F41.9 ANXIETY: ICD-10-CM

## 2025-07-22 RX ORDER — HYDROXYZINE HYDROCHLORIDE 25 MG/1
TABLET, FILM COATED ORAL
Qty: 30 TABLET | Refills: 5 | OUTPATIENT
Start: 2025-07-22

## 2025-08-04 DIAGNOSIS — F41.9 ANXIETY: Chronic | ICD-10-CM

## 2025-08-05 ENCOUNTER — OFFICE VISIT (OUTPATIENT)
Dept: FAMILY MEDICINE CLINIC | Facility: CLINIC | Age: 65
End: 2025-08-05
Payer: COMMERCIAL

## 2025-08-05 VITALS
RESPIRATION RATE: 18 BRPM | HEART RATE: 67 BPM | WEIGHT: 202 LBS | OXYGEN SATURATION: 98 % | DIASTOLIC BLOOD PRESSURE: 70 MMHG | TEMPERATURE: 97.4 F | SYSTOLIC BLOOD PRESSURE: 154 MMHG | BODY MASS INDEX: 33.66 KG/M2 | HEIGHT: 65 IN

## 2025-08-05 DIAGNOSIS — B37.9 YEAST INFECTION: ICD-10-CM

## 2025-08-05 DIAGNOSIS — F41.1 GENERALIZED ANXIETY DISORDER: Primary | ICD-10-CM

## 2025-08-05 DIAGNOSIS — N30.00 ACUTE CYSTITIS WITHOUT HEMATURIA: ICD-10-CM

## 2025-08-05 DIAGNOSIS — R39.9 UTI SYMPTOMS: ICD-10-CM

## 2025-08-05 LAB
BILIRUB BLD-MCNC: ABNORMAL MG/DL
CLARITY, POC: CLEAR
COLOR UR: YELLOW
GLUCOSE UR STRIP-MCNC: NEGATIVE MG/DL
KETONES UR QL: NEGATIVE
LEUKOCYTE EST, POC: ABNORMAL
NITRITE UR-MCNC: NEGATIVE MG/ML
PH UR: 6 [PH] (ref 5–8)
PROT UR STRIP-MCNC: NEGATIVE MG/DL
RBC # UR STRIP: NEGATIVE /UL
SP GR UR: 1.03 (ref 1–1.03)
UROBILINOGEN UR QL: ABNORMAL

## 2025-08-05 PROCEDURE — 81003 URINALYSIS AUTO W/O SCOPE: CPT | Performed by: NURSE PRACTITIONER

## 2025-08-05 PROCEDURE — 99214 OFFICE O/P EST MOD 30 MIN: CPT | Performed by: NURSE PRACTITIONER

## 2025-08-05 RX ORDER — FLUCONAZOLE 150 MG/1
150 TABLET ORAL ONCE
Qty: 1 TABLET | Refills: 0 | Status: SHIPPED | OUTPATIENT
Start: 2025-08-05 | End: 2025-08-05

## 2025-08-05 RX ORDER — SULFAMETHOXAZOLE AND TRIMETHOPRIM 800; 160 MG/1; MG/1
1 TABLET ORAL 2 TIMES DAILY
Qty: 6 TABLET | Refills: 0 | Status: SHIPPED | OUTPATIENT
Start: 2025-08-05 | End: 2025-08-08

## 2025-08-05 RX ORDER — ALPRAZOLAM 0.5 MG
0.5 TABLET ORAL 2 TIMES DAILY PRN
Qty: 60 TABLET | Refills: 2 | Status: CANCELLED | OUTPATIENT
Start: 2025-08-05

## 2025-08-05 RX ORDER — ALPRAZOLAM 0.5 MG
0.5 TABLET ORAL 3 TIMES DAILY PRN
Qty: 90 TABLET | Refills: 2 | Status: SHIPPED | OUTPATIENT
Start: 2025-08-05

## 2025-08-05 RX ORDER — DULOXETIN HYDROCHLORIDE 20 MG/1
20 CAPSULE, DELAYED RELEASE ORAL DAILY
Qty: 90 CAPSULE | Refills: 1 | Status: SHIPPED | OUTPATIENT
Start: 2025-08-05

## 2025-08-09 LAB
BACTERIA UR CULT: ABNORMAL
BACTERIA UR CULT: ABNORMAL
OTHER ANTIBIOTIC SUSC ISLT: ABNORMAL